# Patient Record
Sex: MALE | Race: ASIAN | Employment: FULL TIME | URBAN - METROPOLITAN AREA
[De-identification: names, ages, dates, MRNs, and addresses within clinical notes are randomized per-mention and may not be internally consistent; named-entity substitution may affect disease eponyms.]

---

## 2018-10-02 ENCOUNTER — OFFICE VISIT (OUTPATIENT)
Dept: FAMILY MEDICINE | Facility: CLINIC | Age: 43
End: 2018-10-02
Payer: COMMERCIAL

## 2018-10-02 VITALS
WEIGHT: 155.8 LBS | TEMPERATURE: 97.3 F | HEIGHT: 67 IN | OXYGEN SATURATION: 98 % | RESPIRATION RATE: 16 BRPM | BODY MASS INDEX: 24.45 KG/M2 | HEART RATE: 55 BPM | DIASTOLIC BLOOD PRESSURE: 90 MMHG | SYSTOLIC BLOOD PRESSURE: 127 MMHG

## 2018-10-02 DIAGNOSIS — Z09 FOLLOW-UP EXAMINATION: Primary | ICD-10-CM

## 2018-10-02 DIAGNOSIS — M51.26 LUMBAR HERNIATED DISC: ICD-10-CM

## 2018-10-02 DIAGNOSIS — R29.898 RIGHT LEG WEAKNESS: ICD-10-CM

## 2018-10-02 DIAGNOSIS — M54.41 ACUTE BILATERAL LOW BACK PAIN WITH RIGHT-SIDED SCIATICA: ICD-10-CM

## 2018-10-02 PROCEDURE — 99203 OFFICE O/P NEW LOW 30 MIN: CPT | Performed by: NURSE PRACTITIONER

## 2018-10-02 RX ORDER — PREDNISONE 20 MG/1
40 TABLET ORAL DAILY
Qty: 10 TABLET | Refills: 0 | Status: SHIPPED | OUTPATIENT
Start: 2018-10-02 | End: 2018-10-07

## 2018-10-02 RX ORDER — CYCLOBENZAPRINE HCL 10 MG
5-10 TABLET ORAL 3 TIMES DAILY PRN
Qty: 30 TABLET | Refills: 1 | Status: SHIPPED | OUTPATIENT
Start: 2018-10-02

## 2018-10-02 NOTE — PROGRESS NOTES
SUBJECTIVE:   Casey Griffin is a 43 year old male who presents to clinic today for the following health issues:      Back Pain     Onset: 2 weeks    Description:   Location: lower back  Character: Sharp, worse with prolonged sitting/ standing, improved with laying down.     Progression of Symptoms: worse    Accompanying Signs & Symptoms:  Other symptoms: radiation of pain to right hip and leg, R leg weakness    Precipitating factors:   Trauma or overuse: none.  Had similar episode a few weeks ago, had xray done at [facility] showing bulging disc per pt.   Therapies Tried and outcome: tylenol    Denies unexplained weight loss, fever, night sweats, or loss of bowel or bladder.            Problem list and histories reviewed & adjusted, as indicated.  Additional history: as documented    There is no problem list on file for this patient.    History reviewed. No pertinent surgical history.    Social History   Substance Use Topics     Smoking status: Never Smoker     Smokeless tobacco: Never Used     Alcohol use Yes      Comment: occ     History reviewed. No pertinent family history.      Current Outpatient Prescriptions   Medication Sig Dispense Refill     cyclobenzaprine (FLEXERIL) 10 MG tablet Take 0.5-1 tablets (5-10 mg) by mouth 3 times daily as needed for muscle spasms 30 tablet 1     predniSONE (DELTASONE) 20 MG tablet Take 2 tablets (40 mg) by mouth daily for 5 days 10 tablet 0     No Known Allergies  Recent Labs   Lab Test  06/23/11   1205   ALT  32   CR  0.70   GFRESTIMATED  >90   GFRESTBLACK  >90   POTASSIUM  3.8      BP Readings from Last 3 Encounters:   10/02/18 127/90   06/26/11 122/89   06/23/11 108/88    Wt Readings from Last 3 Encounters:   10/02/18 155 lb 12.8 oz (70.7 kg)   06/26/11 136 lb (61.7 kg)                  Labs reviewed in EPIC    Reviewed and updated as needed this visit by clinical staff  Tobacco  Allergies  Meds  Problems  Med Hx  Surg Hx  Fam Hx  Soc Hx        Reviewed and updated as  "needed this visit by Provider  Allergies  Meds  Problems         ROS:  Constitutional, HEENT, cardiovascular, pulmonary, GI, , musculoskeletal, neuro, skin, endocrine and psych systems are negative, except as otherwise noted.    OBJECTIVE:     /90  Pulse 55  Temp 97.3  F (36.3  C) (Oral)  Resp 16  Ht 5' 7\" (1.702 m)  Wt 155 lb 12.8 oz (70.7 kg)  SpO2 98%  BMI 24.4 kg/m2  Body mass index is 24.4 kg/(m^2).  GENERAL: healthy, alert and no distress  RESP: lungs clear to auscultation - no rales, rhonchi or wheezes  CV: regular rate and rhythm, normal S1 S2, no S3 or S4, no murmur, click or rub, no peripheral edema and peripheral pulses strong  MS: no gross musculoskeletal defects noted, no edema, no pain with palpation of low back POSITIVE for significant low back pain with dependent leg raise > 30 degrees bilaterally.  Using cane for ambulation.   NEURO: POSITIVE for weakness on exam of R leg.   PSYCH: mentation appears normal, affect normal/bright    Diagnostic Test Results:  See orders    ASSESSMENT/PLAN:         ICD-10-CM    1. Follow-up examination Z09    2. Acute bilateral low back pain with right-sided sciatica M54.41 cyclobenzaprine (FLEXERIL) 10 MG tablet     predniSONE (DELTASONE) 20 MG tablet     ORTHO  REFERRAL   3. Right leg weakness R29.898 ORTHO  REFERRAL   4. Lumbar herniated disc M51.26 ORTHO  REFERRAL    reoccurrance of symptoms- xray at [facility]- herniated disc       See Patient Instructions: Take medication as prescribed.  Schedule with spine.  Follow up if needed for worsening symptoms.     Pamela Canales, PEDRO  [facility]  "

## 2018-10-02 NOTE — MR AVS SNAPSHOT
"              After Visit Summary   10/2/2018    Casey Griffin    MRN: [id number]           Patient Information     Date Of Birth          1975        Visit Information        Provider Department      10/2/2018 12:00 PM Pamela Canales, NP [facility]        Today's Diagnoses     Acute bilateral low back pain with right-sided sciatica    -  1    Right leg weakness        Lumbar herniated disc          Care Instructions    Take medication as prescribed.  Schedule with spine.  Follow up if needed for worsening symptoms.   * Sciatica    Sciatica (\"Lumbar Radiculopathy\") causes a pain that spreads from the lower back down into the buttock, hip and leg. Sometimes leg pain can occur without any back pain. Sciatica is due to irritation or pressure on a spinal nerve as it comes out of the spinal canal. This is most often due to a bulge or rupture of a nearby spinal disk (the cartilage cushion between each spinal bone), which presses on a nearby nerve. Other causes include spinal stenosis (narrowing of the spinal canal) and spasm of the piriformis muscle (a muscle in the buttocks that the sciatic nerve passes through).  Sciatica may begin after a sudden twisting/bending force (such as in a car accident), or sometimes after a simple awkward movement. In either case, muscle spasm is commonly present and contributes to the pain.  The diagnosis of sciatica is made from the symptoms and physical exam. Unless you had a physical injury (such as a car accident or fall), X-rays are usually not ordered for the initial evaluation of sciatica because the nerves and disks cannot be seen on an X-ray. Most sciatica (80-90%) gets better with time.  What can I do about my low back pain?  There are three main things you can do to ease low back pain and help it go away.    Use heat or cold packs.    Take medicine as directed.    Use positions, movements and exercises. Stay active! Too much rest can make your symptoms worse.  Using " heat or cold packs  Try cold packs or gentle heat to ease your pain. Use whichever gives the most relief. Apply the cold pack or heat for 15 minutes at a time, as often as needed.  Taking medicine  If taking over-the-counter medicine:    Take ibuprofen (Advil, Motrin) 600 mg. three times a day as needed for pain.  OR  ? Take Aleve (naproxen sodium) 220 to 440 mg. two times a day as needed for pain  If your doctor prescribed a muscle relaxant (cyclobenzapine 10 mg.):    Take one half ( ) to 1 tablet at bedtime    Do not drive when taking this medicine. This drug may make you sleepy.  Using positions, movements and exercises  Research tells us that moving your joints and muscles can help you recover from back pain. Such activity should be simple and gentle.  Use the positions below as well as walking to help relieve your discomfort. Try taking a short walk every 3 to 4 hours during the day. Walk for a few minutes inside your home or take longer walks outside, on a treadmill or at a mall. Slowly increase the amount of time you walk. Expect discomfort when you begin, but it should lessen as your back starts to recover.  Finding a position that is comfortable  When your back pain is new, you may find that certain positions will ease your pain. Gently try each of the following positions until you find one that eases your pain. Once you find a position of comfort, use it as often as you like while you recover. Return to your daily routine as soon as possible.     Lie on your back with your legs bent. You can do this by placing a pillow under your knees or lie on the floor and rest your lower legs on the seat of a chair.    Lie on your side with your knees bent and place a pillow between your knees.    Lie on your stomach over pillows.  When should I call my doctor?  Your back pain should improve over the first couple of weeks. As it improves, you should be able to return to your normal activities. But call your doctor  if:    You have a sudden change in your ability to control? your bladder or bowels.    You begin to feel tingling in your groin or legs.    The pain spreads down your leg and into your foot.    Your toes, feet or leg muscles begin to feel weak.    You feel generally unwell or sick.    Your pain gets worse.    2403-4436 [employer]. [address] [place] [address]. All rights reserved. This information is not intended as a substitute for professional medical care. Always follow your healthcare professional's instructions.  This information has been modified by your health care provider with permission from the publisher.                Follow-ups after your visit        Additional Services     ORTHO  REFERRAL       [facility] is referring you to the Orthopedic  Services at [facility] [place].       The  Representative will assist you in the coordination of your Orthopedic and Musculoskeletal Care as prescribed by your physician.    The  Representative will call you within 1 business day to help schedule your appointment, or you may contact the [facility] Representative at:    All areas ~ [phone]     Type of Referral : Spine: Lumbar: Medical Spine/Non-Surgical Specialist        Timeframe requested: 3 - 5 days    Coverage of these services is subject to the terms and limitations of your health insurance plan.  Please call member services at your health plan with any benefit or coverage questions.      If X-rays, CT or MRI's have been performed, please contact the facility where they were done to arrange for , prior to your scheduled appointment.  Please bring this referral request to your appointment and present it to your specialist.                  Follow-up notes from your care team     Return if symptoms worsen or fail to improve.      Who to contact     Normal or non-critical lab and imaging results will be  "communicated to you by MyChart, letter or phone within 4 business days after the clinic has received the results. If you do not hear from us within 7 days, please contact the clinic through MyChart or phone. If you have a critical or abnormal lab result, we will notify you by phone as soon as possible.  Submit refill requests through [email or web address] or call your pharmacy and they will forward the refill request to us. Please allow 3 business days for your refill to be completed.          If you need to speak with a  for additional information , please call: [phone]             Additional Information About Your Visit        Care EveryWhere ID     This is your Care EveryWhere ID. This could be used by other organizations to access your [place] medical records  [id number]        Your Vitals Were     Pulse Temperature Respirations Height Pulse Oximetry BMI (Body Mass Index)    55 97.3  F (36.3  C) (Oral) 16 5' 7\" (1.702 m) 98% 24.4 kg/m2       Blood Pressure from Last 3 Encounters:   10/02/18 127/90   06/26/11 122/89   06/23/11 108/88    Weight from Last 3 Encounters:   10/02/18 155 lb 12.8 oz (70.7 kg)   06/26/11 136 lb (61.7 kg)              We Performed the Following     ORTHO  REFERRAL          Today's Medication Changes          These changes are accurate as of 10/2/18 12:31 PM.  If you have any questions, ask your nurse or doctor.               Start taking these medicines.        Dose/Directions    cyclobenzaprine 10 MG tablet   Commonly known as:  FLEXERIL   Used for:  Acute bilateral low back pain with right-sided sciatica   Started by:  Pamela Canales NP        Dose:  5-10 mg   Take 0.5-1 tablets (5-10 mg) by mouth 3 times daily as needed for muscle spasms   Quantity:  30 tablet   Refills:  1       predniSONE 20 MG tablet   Commonly known as:  DELTASONE   Used for:  Acute bilateral low back pain with right-sided sciatica   Started by:  Pamela Canales NP        Dose:  40 mg   Take " 2 tablets (40 mg) by mouth daily for 5 days   Quantity:  10 tablet   Refills:  0         Stop taking these medicines if you haven't already. Please contact your care team if you have questions.     PEPTO-BISMOL PO   Stopped by:  Pamela Canales NP                Where to get your medicines      These medications were sent to [facility] [place], [place] - [address] [facility] [place], [place]  [address] [facility] [place], [place] [address]     Phone:  [phone]     cyclobenzaprine 10 MG tablet    predniSONE 20 MG tablet                Primary Care Provider Office Phone # Fax #    [facility] [phone] [address] [facility] [address]        Equal Access to Services     DILIP MOSELEY : Hadii joyce saul hadasho Soomaali, waaxda luqadaha, qaybta kaalmada adeegyada, waxcaitlin beckerin luz bliss . So [facility] [phone].    ATENCIÓN: Si habla español, tiene a webb disposición servicios gratuitos de asistencia lingüística. [facility] [phone].    We comply with applicable federal civil rights laws and [place] laws. We do not discriminate on the basis of race, color, national origin, age, disability, sex, sexual orientation, or gender identity.            Thank you!     Thank you for choosing [facility]  for your care. Our goal is always to provide you with excellent care. Hearing back from our patients is one way we can continue to improve our services. Please take a few minutes to complete the written survey that you may receive in the mail after your visit with us. Thank you!             Your Updated Medication List - Protect others around you: Learn how to safely use, store and throw away your medicines at www.disposemymeds.org.          This list is accurate as of 10/2/18 12:31 PM.  Always use your most recent med list.                   Brand Name Dispense Instructions for use Diagnosis    cyclobenzaprine 10 MG tablet    FLEXERIL    30 tablet    Take 0.5-1 tablets  (5-10 mg) by mouth 3 times daily as needed for muscle spasms    Acute bilateral low back pain with right-sided sciatica       predniSONE 20 MG tablet    DELTASONE    10 tablet    Take 2 tablets (40 mg) by mouth daily for 5 days    Acute bilateral low back pain with right-sided sciatica

## 2018-10-02 NOTE — NURSING NOTE
"Chief Complaint   Patient presents with     Pain       Initial /90  Pulse 55  Temp 97.3  F (36.3  C) (Oral)  Resp 16  Ht 5' 7\" (1.702 m)  Wt 155 lb 12.8 oz (70.7 kg)  SpO2 98%  BMI 24.4 kg/m2 Estimated body mass index is 24.4 kg/(m^2) as calculated from the following:    Height as of this encounter: 5' 7\" (1.702 m).    Weight as of this encounter: 155 lb 12.8 oz (70.7 kg).  Medication Reconciliation: complete     Gisela Roberts MA  "

## 2018-10-02 NOTE — LETTER
October 2, 2018      Casey Griffin  [address] [place] [address]        To Whom It May Concern:    Casey Griffin  was seen on 10/02/18 .  Please excuse him  until 10/9/18 due to injury.    If you have questions or concerns please let is know.    Sincerely,        SAMUEL Escobar, FNP-BC/mp

## 2018-10-02 NOTE — PATIENT INSTRUCTIONS
"Take medication as prescribed.  Schedule with spine.  Follow up if needed for worsening symptoms.   * Sciatica    Sciatica (\"Lumbar Radiculopathy\") causes a pain that spreads from the lower back down into the buttock, hip and leg. Sometimes leg pain can occur without any back pain. Sciatica is due to irritation or pressure on a spinal nerve as it comes out of the spinal canal. This is most often due to a bulge or rupture of a nearby spinal disk (the cartilage cushion between each spinal bone), which presses on a nearby nerve. Other causes include spinal stenosis (narrowing of the spinal canal) and spasm of the piriformis muscle (a muscle in the buttocks that the sciatic nerve passes through).  Sciatica may begin after a sudden twisting/bending force (such as in a car accident), or sometimes after a simple awkward movement. In either case, muscle spasm is commonly present and contributes to the pain.  The diagnosis of sciatica is made from the symptoms and physical exam. Unless you had a physical injury (such as a car accident or fall), X-rays are usually not ordered for the initial evaluation of sciatica because the nerves and disks cannot be seen on an X-ray. Most sciatica (80-90%) gets better with time.  What can I do about my low back pain?  There are three main things you can do to ease low back pain and help it go away.    Use heat or cold packs.    Take medicine as directed.    Use positions, movements and exercises. Stay active! Too much rest can make your symptoms worse.  Using heat or cold packs  Try cold packs or gentle heat to ease your pain. Use whichever gives the most relief. Apply the cold pack or heat for 15 minutes at a time, as often as needed.  Taking medicine  If taking over-the-counter medicine:    Take ibuprofen (Advil, Motrin) 600 mg. three times a day as needed for pain.  OR  ? Take Aleve (naproxen sodium) 220 to 440 mg. two times a day as needed for pain  If your doctor prescribed a muscle " relaxant (cyclobenzapine 10 mg.):    Take one half ( ) to 1 tablet at bedtime    Do not drive when taking this medicine. This drug may make you sleepy.  Using positions, movements and exercises  Research tells us that moving your joints and muscles can help you recover from back pain. Such activity should be simple and gentle.  Use the positions below as well as walking to help relieve your discomfort. Try taking a short walk every 3 to 4 hours during the day. Walk for a few minutes inside your home or take longer walks outside, on a treadmill or at a mall. Slowly increase the amount of time you walk. Expect discomfort when you begin, but it should lessen as your back starts to recover.  Finding a position that is comfortable  When your back pain is new, you may find that certain positions will ease your pain. Gently try each of the following positions until you find one that eases your pain. Once you find a position of comfort, use it as often as you like while you recover. Return to your daily routine as soon as possible.     Lie on your back with your legs bent. You can do this by placing a pillow under your knees or lie on the floor and rest your lower legs on the seat of a chair.    Lie on your side with your knees bent and place a pillow between your knees.    Lie on your stomach over pillows.  When should I call my doctor?  Your back pain should improve over the first couple of weeks. As it improves, you should be able to return to your normal activities. But call your doctor if:    You have a sudden change in your ability to control? your bladder or bowels.    You begin to feel tingling in your groin or legs.    The pain spreads down your leg and into your foot.    Your toes, feet or leg muscles begin to feel weak.    You feel generally unwell or sick.    Your pain gets worse.    9428-2762 [employer]. [address] [place] [address], [place] [address]. All rights reserved. This information is not  intended as a substitute for professional medical care. Always follow your healthcare professional's instructions.  This information has been modified by your health care provider with permission from the publisher.

## 2018-10-06 ENCOUNTER — OFFICE VISIT (OUTPATIENT)
Dept: ORTHOPEDICS | Facility: CLINIC | Age: 43
End: 2018-10-06
Payer: COMMERCIAL

## 2018-10-06 VITALS
HEIGHT: 67 IN | WEIGHT: 155 LBS | BODY MASS INDEX: 24.33 KG/M2 | DIASTOLIC BLOOD PRESSURE: 78 MMHG | SYSTOLIC BLOOD PRESSURE: 130 MMHG

## 2018-10-06 DIAGNOSIS — G89.29 CHRONIC BILATERAL LOW BACK PAIN, WITH SCIATICA PRESENCE UNSPECIFIED: Primary | ICD-10-CM

## 2018-10-06 DIAGNOSIS — M54.5 CHRONIC BILATERAL LOW BACK PAIN, WITH SCIATICA PRESENCE UNSPECIFIED: Primary | ICD-10-CM

## 2018-10-06 PROCEDURE — 99203 OFFICE O/P NEW LOW 30 MIN: CPT | Performed by: PEDIATRICS

## 2018-10-06 NOTE — PROGRESS NOTES
Sports Medicine Clinic Visit    PCP: Evita Kim    Casey Griffin is a 43 year old male who is seen  in consultation at the request of  Pamela Canales N.P. presenting with low back pain over the past year with an increase in pain over the past 2 weeks.  Pain with standing and walking.  Has had to use a cane for ambulation.  Does have pain that travels down his leg as well as tingling to his foot.    States he did have an x ray thru [facility], unable to obtain as no information in Care Everywhere.   He was given what he believes is a muscle relaxant which was helpful.  Feels there may have been some benefit from the oral steroid     Injury: ongoing     **  Lumbar pain. Worse on right. Improved in mornings. Unable to extend lumbar spine. Aggravated by movement. Intermittent shooting pain down right leg. Clicking sound in hip and knee. Symptoms improved with prednisone and flexeril    Presents with wife        Location of Pain: bilateral low back and right leg   Duration of Pain: 1 year(s)  Rating of Pain at worst: 9/10  Rating of Pain Currently: 5/10  Symptoms are better with: Rest and laying down   Symptoms are worse with: standing and walking   Additional Features:   Positive: paresthesias and weakness   Negative: swelling, bruising, popping, grinding, catching, locking, instability, numbness, pain in other joints and systemic symptoms  Other evaluation and/or treatments so far consists of: Nothing  Prior History of related problems: denies     Social History:      Casey was asked to complete the Oswestry Low Back Disability Index   today in the office.  Disability score: 57.14%.       Review of Systems  Musculoskeletal: as above  Remainder of review of systems is negative including constitutional, CV, pulmonary, GI, Skin and Neurologic except as noted in HPI or medical history.    PMHx: Occasional hip area discomfort.  No significant back pain in the past.  PSHx: Noncontributory  Fam hx:   "noncontributory    Social History     Social History     Marital status:      Spouse name: N/A     Number of children: N/A     Years of education: N/A     Occupational History     Not on file.     Social History Main Topics     Smoking status: Never Smoker     Smokeless tobacco: Never Used     Alcohol use Yes      Comment: occ     Drug use: No     Sexual activity: Yes     Partners: Female     Other Topics Concern     Not on file     Social History Narrative     This document serves as a record of the services and decisions personally performed and made by DO JESUS Tong. It was created on their behalf by Ronak Kyle, a trained medical scribe. The creation of this document is based the provider's statements to the medical scribe.  Ronak Kyle October 6, 2018 1:07 PM    Objective  /78  Ht 5' 7\" (1.702 m)  Wt 155 lb (70.3 kg)  BMI 24.28 kg/m2    GENERAL APPEARANCE: healthy, alert and somewhat uncomfortable with pain  GAIT: broad-based  SKIN: no suspicious lesions or rashes  NEURO: Normal strength and tone, mentation intact and speech normal  PSYCH:  mentation appears normal and affect normal/bright  HEENT: no scleral icterus  CV: no extremity edema  RESP: nonlabored breathing    Low back exam:    Pain with position changes, including onto and off exam table    Inspection:       no visible deformity in the low back       normal skin       normal vascular       normal lymphatic    Non Tender:  To palpation  Tension noted over right paraspinals    Strength:       hip flexion 5/5 with hip pain       knee extension 5/5       Knee flexion 5/5       ankle dorsiflexion 5/5       ankle plantarflexion 5/5     Reflexes:       patellar (L3, L4) symmetric normal       achilles tendons (S1) symmetric normal    Sensation:      grossly intact throughout lower extremities    Skin:       well perfused       capillary refill brisk    Special tests:       straight leg raise left positive for pain in hip       " "straight leg raise right positive for pain in hip       slump test negative with pain in low back bilaterally    Some hip area discomfort with straight leg raise testing, no radicular pain.    Bilateral hip:  Grossly symmetric, no significant change in pain      Radiology:    None today.  By patient report, had plain films elsewhere, stating [facility] in [place].  however, no report available in care everywhere.  Per patient, he had disc space narrowing in the lower lumbar spine.  Reviewed note from primary care.  Per note: \"xray at [facility]- herniated disc\".  Unfortunately, I do not have access to this imaging today.    Assessment:  1. Chronic bilateral low back pain, with sciatica presence unspecified        Plan:  Discussed the assessment with the patient and wife. By patient report, degenerative changes in the lumbar spine on plain films.  Also sounds like intermittent radiculopathy, with intact neurologic function today.  We discussed the following: symptom treatment, activity modification/rest, imaging, rehab, injection therapy and referral to spine surgeon.    Following discussion, plan:  Topical Treatments: Ice or Heat as needed   Over the counter medication: Patient's preferred OTC medication as needed discussed prescription medications.  Had prescription for Flexeril and prednisone.  Feels like each were somewhat beneficial.  Activity Modification: as discussed  Rehab: Physical Therapy: Referral placed.   Letter written for work.  We discussed potential for work restrictions, light duty.  He prefers to continue off of work for now, was placed off work by primary care.  Future consideration of MRI of the lumbar spine.  Reasonable consideration currently given pain level, though intact neurologic function, so not required.  Future consideration of steroid injection of the lumbar spine; would require MRI.  Follow up: 3-4 weeks if not improving well with above, sooner if needed.   We discussed potentially " concerning signs and symptoms related to the condition(s) listed above, including increase in pain, changing pain, and the patient was instructed to seek appropriate medical care if noted. All questions answered to patient's satisfaction. The patient expressed understanding of the plan.     Marcelo Eckert DO, CAMICHI    CC: Pamela Canales        Disclaimer: This note consists of symbols derived from keyboarding, dictation and/or voice recognition software. As a result, there may be errors in the script that have gone undetected. Please consider this when interpreting information found in this chart.    The information in this document, created by the medical scribe for me, accurately reflects the services I personally performed and the decisions made by me. I have reviewed and approved this document for accuracy prior to leaving the patient care area.

## 2018-10-06 NOTE — LETTER
[facility]  [address]  [place] [address]  Phone: [phone]  Fax: [phone]      October 6, 2018      RE: Casey Griffin  [address]  [place] [address]        To whom it may concern:    Casey Griffin was seen in clinic today. Due to medical condition, the employee is UNABLE to return to work for 3 weeks.          Sincerely,          Marcelo HERNANDEZ.

## 2018-10-06 NOTE — MR AVS SNAPSHOT
After Visit Summary   10/6/2018    Casey Griffin    MRN: [id number]           Patient Information     Date Of Birth          1975        Visit Information        Provider Department      10/6/2018 12:40 PM Marcelo Eckert,  [facility] [place]        Today's Diagnoses     Chronic bilateral low back pain, with sciatica presence unspecified    -  1      Care Instructions    * Physical Therapy referral    * Letter written for work          Follow-ups after your visit        Additional Services     [facility] PT, HAND, AND CHIROPRACTIC REFERRAL       Physical Therapy, Hand Therapy and Chiropractic Care are available through:  *[facility]  *Hand Therapy (Occupational Therapy or Physical Therapy)  *[facility]    Call one number to schedule at any of the above locations: [phone].    Physical therapy, Hand therapy and/or Chiropractic care has been recommended by your physician as an excellent treatment option to reduce pain and help people return to normal activities, including sports.  Therapy and/or chiropractic care services are a great complement or alternative to expensive and invasive surgery, injections, or long-term use of prescription medications. The primary goal is to identify the underlying problem and provide you the tools to manage your condition on your own.     Please be aware that coverage of these services is subject to the terms and limitations of your health insurance plan.  Call member services at your health plan with any benefit or coverage questions.      Please bring the following to your appointment:  *Your personal calendar for scheduling future appointments  *Comfortable clothing                  Follow-up notes from your care team     Return in about 4 weeks (around 11/3/2018).      Future tests that were ordered for you today     Open Future Orders        Priority Expected Expires Ordered    [facility] PT,  "HAND, AND CHIROPRACTIC REFERRAL Routine  10/6/2019 10/6/2018            Who to contact     If you have questions or need follow up information about today's clinic visit or your schedule please contact [facility] directly at [phone].  Normal or non-critical lab and imaging results will be communicated to you by MyChart, letter or phone within 4 business days after the clinic has received the results. If you do not hear from us within 7 days, please contact the clinic through MyChart or phone. If you have a critical or abnormal lab result, we will notify you by phone as soon as possible.  Submit refill requests through [employer] or call your pharmacy and they will forward the refill request to us. Please allow 3 business days for your refill to be completed.          Additional Information About Your Visit        Care EveryWhere ID     This is your Care EveryWhere ID. This could be used by other organizations to access your [facility] medical records  [id number]        Your Vitals Were     Height BMI (Body Mass Index)                5' 7\" (1.702 m) 24.28 kg/m2           Blood Pressure from Last 3 Encounters:   10/06/18 130/78   10/02/18 127/90   06/26/11 122/89    Weight from Last 3 Encounters:   10/06/18 155 lb (70.3 kg)   10/02/18 155 lb 12.8 oz (70.7 kg)   06/26/11 136 lb (61.7 kg)               Primary Care Provider Office Phone # Fax #    Evita Lucero [facility] [phone]       [address] [facility] [address] [address]        Equal Access to Services     [facility]: Hadii aad ku hadasho Soomaali, waaxda luqadaha, qaybta kaalmada adeegscarlet, cynthia bliss . So [facility] [phone].    ATENCIÓN: Si habla español, tiene a webb disposición servicios gratuitos de asistencia lingüística. Llame al [phone].    We comply with applicable federal civil rights laws and [place] laws. We do not discriminate on the basis of race, color, national " origin, age, disability, sex, sexual orientation, or gender identity.            Thank you!     Thank you for choosing [facility]  for your care. Our goal is always to provide you with excellent care. Hearing back from our patients is one way we can continue to improve our services. Please take a few minutes to complete the written survey that you may receive in the mail after your visit with us. Thank you!             Your Updated Medication List - Protect others around you: Learn how to safely use, store and throw away your medicines at www.disposemymeds.org.          This list is accurate as of 10/6/18  1:36 PM.  Always use your most recent med list.                   Brand Name Dispense Instructions for use Diagnosis    cyclobenzaprine 10 MG tablet    FLEXERIL    30 tablet    Take 0.5-1 tablets (5-10 mg) by mouth 3 times daily as needed for muscle spasms    Acute bilateral low back pain with right-sided sciatica       predniSONE 20 MG tablet    DELTASONE    10 tablet    Take 2 tablets (40 mg) by mouth daily for 5 days    Acute bilateral low back pain with right-sided sciatica

## 2018-10-09 ENCOUNTER — THERAPY VISIT (OUTPATIENT)
Dept: PHYSICAL THERAPY | Facility: CLINIC | Age: 43
End: 2018-10-09
Payer: COMMERCIAL

## 2018-10-09 DIAGNOSIS — M54.5 CHRONIC BILATERAL LOW BACK PAIN, WITH SCIATICA PRESENCE UNSPECIFIED: ICD-10-CM

## 2018-10-09 DIAGNOSIS — G89.29 CHRONIC BILATERAL LOW BACK PAIN, WITH SCIATICA PRESENCE UNSPECIFIED: ICD-10-CM

## 2018-10-09 PROCEDURE — 97110 THERAPEUTIC EXERCISES: CPT | Mod: GP | Performed by: PHYSICAL THERAPIST

## 2018-10-09 PROCEDURE — 97161 PT EVAL LOW COMPLEX 20 MIN: CPT | Mod: GP | Performed by: PHYSICAL THERAPIST

## 2018-10-09 NOTE — PROGRESS NOTES
[facility] Initial Evaluation  Subjective:  Patient is a 43 year old male presenting with rehab back hpi. The history is provided by the patient. No  was used.   Casey Griffin is a 43 year old male with a lumbar condition.  Condition occurred with:  Insidious onset.  Condition occurred: for unknown reasons.  This is a recurrent condition  Pain started 3 weeks ago.  Maybe it was from a long 5 hour drive but he is not sure.  History of episodes of LBP for years.  Pt was referred to PT on 10/6/18.    Patient reports pain:  Lower lumbar spine.  Radiates to:  Foot right.  Pain is described as sharp and is intermittent and reported as 6/10.  Associated symptoms:  Loss of motion/stiffness. Pain is worse in the P.M..  Symptoms are exacerbated by bending and relieved by muscle relaxants and NSAID's (cane).  Since onset symptoms are unchanged.  Special tests:  X-ray (pt reports what sounds like DDD).      General health as reported by patient is fair.  Pertinent medical history includes:  None.  Medical allergies: no.  Other surgeries include:  No.  Current medications:  Muscle relaxants.  Current occupation is .  Patient is currently not working due to present treatment problem.  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    Barriers include:  None as reported by the patient.    Red flags:  None as reported by the patient.                        Objective:  Standing Alignment:        Lumbar:  Lordosis decr            Gait:    Gait Type:  Antalgic   Assistive Devices:  Cane  Deviations:  Lumbar:  Trunk flexionGeneral Deviations:  Base of support incr               Lumbar/SI Evaluation  ROM:    AROM Lumbar:   Flexion:          Major loss due to pain  Ext:                    Unable to get to neutral due to pain   Side Bend:        Left:  Major loss due to pain    Right:  Major loss due to pain  Rotation:           Left:      Right:   Side Glide:        Left:     Right:           Lumbar Myotomes:  normal            Lumbar DTR's:  normal        Lumbar Dermtomes:  normal                Neural Tension/Mobility:    Left side:  SLR positive.   Right side:   SLR positive.      Lumbar Provocation:    Left positive with:  PROM hip  Right positive with: PROM hip                                                   Darshan Lumbar Evaluation          Static Tests:        Standing Erect: worse  Lying Prone in Extension: better                                             ROS    Assessment/Plan:    Patient is a 43 year old male with lumbar complaints.    Patient has the following significant findings with corresponding treatment plan.                Diagnosis 1:  LBP  Pain -  manual therapy, self management, education, directional preference exercise and home program  Decreased ROM/flexibility - manual therapy, therapeutic exercise and home program  Impaired gait - gait training and home program  Decreased function - therapeutic activities and home program  Impaired posture - neuro re-education and home program    Therapy Evaluation Codes:   1) History comprised of:   Personal factors that impact the plan of care:      Past/current experiences.    Comorbidity factors that impact the plan of care are:      None.     Medications impacting care: None.  2) Examination of Body Systems comprised of:   Body structures and functions that impact the plan of care:      Lumbar spine.   Activity limitations that impact the plan of care are:      Bathing, Bending, Dressing, Sitting and Standing.  3) Clinical presentation characteristics are:   Stable/Uncomplicated.  4) Decision-Making    Low complexity using standardized patient assessment instrument and/or measureable assessment of functional outcome.  Cumulative Therapy Evaluation is: Low complexity.    Previous and current functional limitations:  (See Goal Flow Sheet for this information)    Short term and  Long term goals: (See Goal Flow Sheet for this information)     Communication ability:  Patient appears to be able to clearly communicate and understand verbal and written communication and follow directions correctly.  Treatment Explanation - The following has been discussed with the patient:   RX ordered/plan of care  Anticipated outcomes  Possible risks and side effects  This patient would benefit from PT intervention to resume normal activities.   Rehab potential is good.    Frequency:  1 X week, once daily  Duration:  for 6 weeks  Discharge Plan:  Achieve all LTG.  Independent in home treatment program.  Reach maximal therapeutic benefit.    Please refer to the daily flowsheet for treatment today, total treatment time and time spent performing 1:1 timed codes.

## 2018-10-09 NOTE — MR AVS SNAPSHOT
After Visit Summary   10/9/2018    Casey Griffin    MRN: [id number]           Patient Information     Date Of Birth          1975        Visit Information        Provider Department      10/9/2018 3:50 PM Salvador Cruz PT [facility] [place] [facility]        Today's Diagnoses     Chronic bilateral low back pain, with sciatica presence unspecified           Follow-ups after your visit        Your next 10 appointments already scheduled     Oct 16, 2018 11:20 AM CDT   [facility] Spine with Salvador Cruz PT   [facility] [place] [facility] ([facility] [place])    [address]  [place] [address]   [phone]            Oct 23, 2018  1:50 PM CDT   [facility] Spine with Salvador Cruz PT   [facility] [place] [facility] ([facility] [place])    [address] [address]  [place] [address]   [phone]              Who to contact     If you have questions or need follow up information about today's clinic visit or your schedule please contact [facility] directly at [phone].  Normal or non-critical lab and imaging results will be communicated to you by MyChart, letter or phone within 4 business days after the clinic has received the results. If you do not hear from us within 7 days, please contact the clinic through MyChart or phone. If you have a critical or abnormal lab result, we will notify you by phone as soon as possible.  Submit refill requests through [employer] or call your pharmacy and they will forward the refill request to us. Please allow 3 business days for your refill to be completed.          Additional Information About Your Visit        Care EveryWhere ID     This is your Care EveryWhere ID. This could be used by other organizations to access your [facility] medical records  [id number]         Blood Pressure from Last 3 Encounters:   10/06/18 130/78   10/02/18 127/90   06/26/11  122/89    Weight from Last 3 Encounters:   10/06/18 70.3 kg (155 lb)   10/02/18 70.7 kg (155 lb 12.8 oz)   06/26/11 61.7 kg (136 lb)              We Performed the Following     HC PT EVAL, LOW COMPLEXITY     [facility] PT, HAND, AND CHIROPRACTIC REFERRAL     THERAPEUTIC EXERCISES        Primary Care Provider Office Phone # Fax #    Evita [facility] [phone]       [address] [address] [address]        Equal Access to Services     [facility]: Hadii aad ku hadasho Soomaali, waaxda luqadaha, qaybta kaalmada adeegyada, waxay idiin hayaan adeeg bailey bliss . So [facility] [phone].    ATENCIÓN: Si habla español, tiene a webb disposición servicios gratuitos de asistencia lingüística. LlFort [facility] [phone].    We comply with applicable federal civil rights laws and [place] laws. We do not discriminate on the basis of race, color, national origin, age, disability, sex, sexual orientation, or gender identity.            Thank you!     Thank you for choosing [facility]  for your care. Our goal is always to provide you with excellent care. Hearing back from our patients is one way we can continue to improve our services. Please take a few minutes to complete the written survey that you may receive in the mail after your visit with us. Thank you!             Your Updated Medication List - Protect others around you: Learn how to safely use, store and throw away your medicines at www.disposemymeds.org.          This list is accurate as of 10/9/18  5:57 PM.  Always use your most recent med list.                   Brand Name Dispense Instructions for use Diagnosis    cyclobenzaprine 10 MG tablet    FLEXERIL    30 tablet    Take 0.5-1 tablets (5-10 mg) by mouth 3 times daily as needed for muscle spasms    Acute bilateral low back pain with right-sided sciatica

## 2018-10-16 ENCOUNTER — THERAPY VISIT (OUTPATIENT)
Dept: PHYSICAL THERAPY | Facility: CLINIC | Age: 43
End: 2018-10-16
Payer: COMMERCIAL

## 2018-10-16 DIAGNOSIS — M54.5 CHRONIC BILATERAL LOW BACK PAIN, WITH SCIATICA PRESENCE UNSPECIFIED: ICD-10-CM

## 2018-10-16 DIAGNOSIS — G89.29 CHRONIC BILATERAL LOW BACK PAIN, WITH SCIATICA PRESENCE UNSPECIFIED: ICD-10-CM

## 2018-10-16 PROCEDURE — 97110 THERAPEUTIC EXERCISES: CPT | Mod: GP | Performed by: PHYSICAL THERAPIST

## 2018-10-16 NOTE — PROGRESS NOTES
Subjective:  HPI                    Objective:  System    Physical Exam    General     ROS    Assessment/Plan:    SUBJECTIVE  Subjective: Much better than last week.  Doesn't need his cane anymore.   Current Pain level: 6/10   Changes in function:  Yes (See Goal flowsheet attached for changes in current functional level)     Adverse reaction to treatment or activity:  None    OBJECTIVE  Objective: Arrives in neutral posture, no longer flexed and not using a cane anymore.  After REIL pt reported impoved pain and better mobility     ASSESSMENT  Palee continues to require intervention to meet STG and LTG's: PT  Patient is progressing as expected.  Response to therapy has shown an improvement in  pain level, ROM  and gait  Progress made towards STG/LTG?  Yes (See Goal flowsheet attached for updates on achievement of STG and LTG)    PLAN  Current treatment program is being advanced to more complex exercises.    PTA/ATC plan:  N/A    Please refer to the daily flowsheet for treatment today, total treatment time and time spent performing 1:1 timed codes.

## 2018-10-16 NOTE — MR AVS SNAPSHOT
After Visit Summary   10/16/2018    Casey Griffin    MRN: [id number]           Patient Information     Date Of Birth          1975        Visit Information        Provider Department      10/16/2018 11:20 AM Salvador Cruz PT [facility]        Today's Diagnoses     Chronic bilateral low back pain, with sciatica presence unspecified           Follow-ups after your visit        Your next 10 appointments already scheduled     Oct 23, 2018  1:50 PM CDT   [facility] Salvador [facility]   [facility] ([facility])    [address]  [address]  [place] [address] [phone]              Who to contact     If you have questions or need follow up information about today's clinic visit or your schedule please contact [facility] directly at [phone].  Normal or non-critical lab and imaging results will be communicated to you by MyChart, letter or phone within 4 business days after the clinic has received the results. If you do not hear from us within 7 days, please contact the clinic through MyChart or phone. If you have a critical or abnormal lab result, we will notify you by phone as soon as possible.  Submit refill requests through [employer] or call your pharmacy and they will forward the refill request to us. Please allow 3 business days for your refill to be completed.          Additional Information About Your Visit        Care EveryWhere ID     This is your Care EveryWhere ID. This could be used by other organizations to access your [place] medical records  [id number]         Blood Pressure from Last 3 Encounters:   10/06/18 130/78   10/02/18 127/90   06/26/11 122/89    Weight from Last 3 Encounters:   10/06/18 70.3 kg (155 lb)   10/02/18 70.7 kg (155 lb 12.8 oz)   06/26/11 61.7 kg (136 lb)              We Performed the Following     THERAPEUTIC EXERCISES        Primary  Care Provider Office Phone # Fax #    [facility] [phone]       [address] [address] [address]        Equal Access to Services     DILIP MOSELEY : Hadii joyce ku hadbrendao Sorenateali, waaxda luqadaha, qaybta kaalmada adelaverneyada, cynthia vivasnnamdi mathis. So [facility] [phone].    ATENCIÓN: Si habla español, tiene a webb disposición servicios gratuitos de asistencia lingüística. Llame al [phone].    We comply with applicable federal civil rights laws and [place] laws. We do not discriminate on the basis of race, color, national origin, age, disability, sex, sexual orientation, or gender identity.            Thank you!     Thank you for choosing [facility]  for your care. Our goal is always to provide you with excellent care. Hearing back from our patients is one way we can continue to improve our services. Please take a few minutes to complete the written survey that you may receive in the mail after your visit with us. Thank you!             Your Updated Medication List - Protect others around you: Learn how to safely use, store and throw away your medicines at www.disposemymeds.org.          This list is accurate as of 10/16/18 12:29 PM.  Always use your most recent med list.                   Brand Name Dispense Instructions for use Diagnosis    cyclobenzaprine 10 MG tablet    FLEXERIL    30 tablet    Take 0.5-1 tablets (5-10 mg) by mouth 3 times daily as needed for muscle spasms    Acute bilateral low back pain with right-sided sciatica

## 2018-10-17 ENCOUNTER — TELEPHONE (OUTPATIENT)
Dept: FAMILY MEDICINE | Facility: CLINIC | Age: 43
End: 2018-10-17

## 2018-10-22 NOTE — TELEPHONE ENCOUNTER
Forms completed to the best of my ability and given to Pamela Canales for review and completion. Gisela Roberts, MA

## 2018-10-23 ENCOUNTER — THERAPY VISIT (OUTPATIENT)
Dept: PHYSICAL THERAPY | Facility: CLINIC | Age: 43
End: 2018-10-23
Payer: COMMERCIAL

## 2018-10-23 DIAGNOSIS — M54.5 CHRONIC BILATERAL LOW BACK PAIN, WITH SCIATICA PRESENCE UNSPECIFIED: ICD-10-CM

## 2018-10-23 DIAGNOSIS — G89.29 CHRONIC BILATERAL LOW BACK PAIN, WITH SCIATICA PRESENCE UNSPECIFIED: ICD-10-CM

## 2018-10-23 PROCEDURE — 97110 THERAPEUTIC EXERCISES: CPT | Mod: GP | Performed by: PHYSICAL THERAPIST

## 2018-10-23 PROCEDURE — 97530 THERAPEUTIC ACTIVITIES: CPT | Mod: GP | Performed by: PHYSICAL THERAPIST

## 2018-10-23 NOTE — PROGRESS NOTES
Subjective:  HPI                    Objective:  System    Physical Exam    General     ROS    Assessment/Plan:    SUBJECTIVE  Subjective: Doing a ton better.  Tried to go back to work yesterday but they sent him home because his work letter stated he should be off for 3 weeks and time is up this weekend   Current pain level:  2/10  Changes in function:  Yes (See Goal flowsheet attached for changes in current functional level)     Adverse reaction to treatment or activity:  None    OBJECTIVE  Objective: Improved lumbar extension in standing and prone.  Appears to have pre-existing lack of lumbar lordosis.      Functional testing for return to work: Maxed at the following weights due to pain:    pushin#, pullin# and lifting from floor 35#     ASSESSMENT  Casey continues to require intervention to meet STG and LTG's: PT  Patient is progressing as expected.  Response to therapy has shown an improvement in  pain level, ROM  and gait  Progress made towards STG/LTG?  Yes (See Goal flowsheet attached for updates on achievement of STG and LTG)    PLAN  Current treatment program is being advanced to more complex exercises.    PTA/ATC plan:  N/A    Please refer to the daily flowsheet for treatment today, total treatment time and time spent performing 1:1 timed codes.

## 2018-10-23 NOTE — MR AVS SNAPSHOT
After Visit Summary   10/23/2018    Casey Griffin    MRN: [id number]           Patient Information     Date Of Birth          1975        Visit Information        Provider Department      10/23/2018 1:50 PM Salvador Cruz PT [facility]        Today's Diagnoses     Chronic bilateral low back pain, with sciatica presence unspecified           Follow-ups after your visit        Your next 10 appointments already scheduled     Nov 02, 2018  1:30 PM CDT   [facility] with Salvador Cruz [facility]   [facility] ([facility])    [address] [address]  [address]  [place] [address] [phone]              Who to contact     If you have questions or need follow up information about today's clinic visit or your schedule please contact [facility] directly at [phone].  Normal or non-critical lab and imaging results will be communicated to you by MyChart, letter or phone within 4 business days after the clinic has received the results. If you do not hear from us within 7 days, please contact the clinic through MyChart or phone. If you have a critical or abnormal lab result, we will notify you by phone as soon as possible.  Submit refill requests through [employer] or call your pharmacy and they will forward the refill request to us. Please allow 3 business days for your refill to be completed.          Additional Information About Your Visit        Care EveryWhere ID     This is your Care EveryWhere ID. This could be used by other organizations to access your [place] medical records  [id number]         Blood Pressure from Last 3 Encounters:   10/06/18 130/78   10/02/18 127/90   06/26/11 122/89    Weight from Last 3 Encounters:   10/06/18 70.3 kg (155 lb)   10/02/18 70.7 kg (155 lb 12.8 oz)   06/26/11 61.7 kg (136 lb)              We Performed the Following     THERAPEUTIC ACTIVITIES     THERAPEUTIC  [facility]        Primary Care Provider Office Phone # Fax #    Pamela Canales [phone]       [address]   [place] [address]        Equal Access to Services     NICK MOSELEY : Hadii aad ku hadasho Soomaali, waaxda luqadaha, qaybta kaalmada adeegyada, cynthia benitezn adenike avalos laJeanlesli mathis. So [facility] [phone].    ATENCIÓN: Si habla español, tiene a webb disposición servicios gratuitos de asistencia lingüística. [facility] [phone].    We comply with applicable federal civil rights laws and [place] laws. We do not discriminate on the basis of race, color, national origin, age, disability, sex, sexual orientation, or gender identity.            Thank you!     Thank you for choosing [facility]  for your care. Our goal is always to provide you with excellent care. Hearing back from our patients is one way we can continue to improve our services. Please take a few minutes to complete the written survey that you may receive in the mail after your visit with us. Thank you!             Your Updated Medication List - Protect others around you: Learn how to safely use, store and throw away your medicines at www.disposemymeds.org.          This list is accurate as of 10/23/18  2:45 PM.  Always use your most recent med list.                   Brand Name Dispense Instructions for use Diagnosis    cyclobenzaprine 10 MG tablet    FLEXERIL    30 tablet    Take 0.5-1 tablets (5-10 mg) by mouth 3 times daily as needed for muscle spasms    Acute bilateral low back pain with right-sided sciatica

## 2018-10-25 ENCOUNTER — TELEPHONE (OUTPATIENT)
Dept: FAMILY MEDICINE | Facility: CLINIC | Age: 43
End: 2018-10-25

## 2018-10-25 NOTE — TELEPHONE ENCOUNTER
Estee from [employer] disability calling to speak with nurse or pcp about the form for patient stated some more information is needed on this form to be completed    Please call to advice  Thank you

## 2018-10-29 ENCOUNTER — TELEPHONE (OUTPATIENT)
Dept: ORTHOPEDICS | Facility: CLINIC | Age: 43
End: 2018-10-29

## 2018-10-29 NOTE — TELEPHONE ENCOUNTER
Patient arrived in clinic looking for a return to work note.    RTW note given to patient.      Micaela Moe MS ATC

## 2018-10-29 NOTE — LETTER
October 29, 2018      RE:Casey Griffin  [address] [place] [address]      To whom it may concern,      Patient is able to return to work.          Sincerely,            Marcelo HEART/howe

## 2018-10-29 NOTE — TELEPHONE ENCOUNTER
Patient asking for return to work letter. Letter can be faxed to [phone]. Please call patient when faxed.

## 2018-11-02 ENCOUNTER — THERAPY VISIT (OUTPATIENT)
Dept: PHYSICAL THERAPY | Facility: CLINIC | Age: 43
End: 2018-11-02
Payer: COMMERCIAL

## 2018-11-02 DIAGNOSIS — G89.29 CHRONIC BILATERAL LOW BACK PAIN, WITH SCIATICA PRESENCE UNSPECIFIED: ICD-10-CM

## 2018-11-02 DIAGNOSIS — M54.5 CHRONIC BILATERAL LOW BACK PAIN, WITH SCIATICA PRESENCE UNSPECIFIED: ICD-10-CM

## 2018-11-02 PROCEDURE — 97530 THERAPEUTIC ACTIVITIES: CPT | Mod: GP | Performed by: PHYSICAL THERAPIST

## 2018-11-02 PROCEDURE — 97110 THERAPEUTIC EXERCISES: CPT | Mod: GP | Performed by: PHYSICAL THERAPIST

## 2018-11-02 NOTE — MR AVS SNAPSHOT
After Visit Summary   11/2/2018    Casey Griffin    MRN: [id number]           Patient Information     Date Of Birth          1975        Visit Information        Provider Department      11/2/2018 1:30 PM Salvador Cruz PT [facility]        Today's Diagnoses     Chronic bilateral low back pain, with sciatica presence unspecified           Follow-ups after your visit        Who to contact     If you have questions or need follow up information about today's clinic visit or your schedule please contact [facility] directly at [phone].  Normal or non-critical lab and imaging results will be communicated to you by MyChart, letter or phone within 4 business days after the clinic has received the results. If you do not hear from us within 7 days, please contact the clinic through MyChart or phone. If you have a critical or abnormal lab result, we will notify you by phone as soon as possible.  Submit refill requests through [employer] or call your pharmacy and they will forward the refill request to us. Please allow 3 business days for your refill to be completed.          Additional Information About Your Visit        Care EveryWhere ID     This is your Care EveryWhere ID. This could be used by other organizations to access your [place] medical records  [id number]         Blood Pressure from Last 3 Encounters:   10/06/18 130/78   10/02/18 127/90   06/26/11 122/89    Weight from Last 3 Encounters:   10/06/18 70.3 kg (155 lb)   10/02/18 70.7 kg (155 lb 12.8 oz)   06/26/11 61.7 kg (136 lb)              We Performed the Following     THERAPEUTIC ACTIVITIES     THERAPEUTIC EXERCISES        Primary Care Provider Office Phone # Fax #    Pamela Canales [phone]       [address]  [place] [address]        Equal Access to Services     DILIP MOSELEY AH: David Connelly, michael gorman, perfecto case  cynthia walkercynthiajosué la'aan ah. [facility] [phone].    ATENCIÓN: Si kellyla michelle, tiene a webb disposición servicios gratuitos de asistencia lingüística. Pia al [phone].    We comply with applicable federal civil rights laws and [place] laws. We do not discriminate on the basis of race, color, national origin, age, disability, sex, sexual orientation, or gender identity.            Thank you!     Thank you for choosing [facility] [place] [facility]  for your care. Our goal is always to provide you with excellent care. Hearing back from our patients is one way we can continue to improve our services. Please take a few minutes to complete the written survey that you may receive in the mail after your visit with us. Thank you!             Your Updated Medication List - Protect others around you: Learn how to safely use, store and throw away your medicines at www.disposemymeds.org.          This list is accurate as of 11/2/18  2:24 PM.  Always use your most recent med list.                   Brand Name Dispense Instructions for use Diagnosis    cyclobenzaprine 10 MG tablet    FLEXERIL    30 tablet    Take 0.5-1 tablets (5-10 mg) by mouth 3 times daily as needed for muscle spasms    Acute bilateral low back pain with right-sided sciatica

## 2018-11-02 NOTE — PROGRESS NOTES
Subjective:  HPI  Oswestry Score: 0 %                 Objective:  System    Physical Exam    General     ROS    Assessment/Plan:    DISCHARGE REPORT    Progress reporting period is from 10/9/18 to 11/2/18.       SUBJECTIVE  Subjective: Went back to work and feels good.  Was able to push/pull/lift without incident.  States he is a little sore in the AM but it loosens up and feels better as the day goes on    Current Pain level: 1/10.     Initial Pain level: 8/10.   Changes in function:  Yes (See Goal flowsheet attached for changes in current functional level)  Adverse reaction to treatment or activity: None    OBJECTIVE  Objective: No pain with end range lumbar extension despite his ROM limitations that appear to be chronic.  Improved body mechanics with less cuing     ASSESSMENT/PLAN  Updated problem list and treatment plan: Diagnosis 1:  LBP    STG/LTGs have been met or progress has been made towards goals:  Yes (See Goal flow sheet completed today.)  Assessment of Progress: The patient's condition is improving.  The patient has met all of their long term goals.  Self Management Plans:  Patient is independent in a home treatment program.  Casey continues to require the following intervention to meet STG and LTG's:  PT intervention is no longer required to meet STG/LTG.    Recommendations:  This patient is ready to be discharged from therapy and continue their home treatment program.    Please refer to the daily flowsheet for treatment today, total treatment time and time spent performing 1:1 timed codes.

## 2019-10-01 PROBLEM — G89.29 CHRONIC BILATERAL LOW BACK PAIN: Status: RESOLVED | Noted: 2018-10-09 | Resolved: 2018-11-02

## 2019-10-01 PROBLEM — M54.50 CHRONIC BILATERAL LOW BACK PAIN: Status: RESOLVED | Noted: 2018-10-09 | Resolved: 2018-11-02
